# Patient Record
Sex: MALE
[De-identification: names, ages, dates, MRNs, and addresses within clinical notes are randomized per-mention and may not be internally consistent; named-entity substitution may affect disease eponyms.]

---

## 2018-03-20 ENCOUNTER — HOSPITAL ENCOUNTER (EMERGENCY)
Dept: HOSPITAL 31 - C.ER | Age: 58
Discharge: HOME | End: 2018-03-20
Payer: MEDICAID

## 2018-03-20 VITALS — HEART RATE: 60 BPM | SYSTOLIC BLOOD PRESSURE: 148 MMHG | DIASTOLIC BLOOD PRESSURE: 84 MMHG | RESPIRATION RATE: 18 BRPM

## 2018-03-20 VITALS — TEMPERATURE: 98.8 F

## 2018-03-20 VITALS — OXYGEN SATURATION: 98 %

## 2018-03-20 DIAGNOSIS — F17.210: ICD-10-CM

## 2018-03-20 DIAGNOSIS — J36: Primary | ICD-10-CM

## 2018-03-20 LAB
BASOPHILS # BLD AUTO: 0.1 K/UL (ref 0–0.2)
BASOPHILS NFR BLD: 0.6 % (ref 0–2)
BUN SERPL-MCNC: 12 MG/DL (ref 9–20)
CALCIUM SERPL-MCNC: 9.4 MG/DL (ref 8.6–10.4)
EOSINOPHIL # BLD AUTO: 0.1 K/UL (ref 0–0.7)
EOSINOPHIL NFR BLD: 0.9 % (ref 0–4)
ERYTHROCYTE [DISTWIDTH] IN BLOOD BY AUTOMATED COUNT: 14.1 % (ref 11.5–14.5)
GFR NON-AFRICAN AMERICAN: > 60
HGB BLD-MCNC: 14.9 G/DL (ref 12–18)
LYMPHOCYTES # BLD AUTO: 2 K/UL (ref 1–4.3)
LYMPHOCYTES NFR BLD AUTO: 19.5 % (ref 20–40)
MCH RBC QN AUTO: 27 PG (ref 27–31)
MCHC RBC AUTO-ENTMCNC: 33.2 G/DL (ref 33–37)
MCV RBC AUTO: 81.4 FL (ref 80–94)
MONOCYTES # BLD: 0.7 K/UL (ref 0–0.8)
MONOCYTES NFR BLD: 6.9 % (ref 0–10)
NEUTROPHILS # BLD: 7.3 K/UL (ref 1.8–7)
NEUTROPHILS NFR BLD AUTO: 72.1 % (ref 50–75)
NRBC BLD AUTO-RTO: 0.1 % (ref 0–2)
PLATELET # BLD: 218 K/UL (ref 130–400)
PMV BLD AUTO: 9.2 FL (ref 7.2–11.7)
RBC # BLD AUTO: 5.51 MIL/UL (ref 4.4–5.9)
WBC # BLD AUTO: 10.1 K/UL (ref 4.8–10.8)

## 2018-03-20 PROCEDURE — 85025 COMPLETE CBC W/AUTO DIFF WBC: CPT

## 2018-03-20 PROCEDURE — 96375 TX/PRO/DX INJ NEW DRUG ADDON: CPT

## 2018-03-20 PROCEDURE — 96365 THER/PROPH/DIAG IV INF INIT: CPT

## 2018-03-20 PROCEDURE — 70491 CT SOFT TISSUE NECK W/DYE: CPT

## 2018-03-20 PROCEDURE — 99283 EMERGENCY DEPT VISIT LOW MDM: CPT

## 2018-03-20 PROCEDURE — 87070 CULTURE OTHR SPECIMN AEROBIC: CPT

## 2018-03-20 PROCEDURE — 80048 BASIC METABOLIC PNL TOTAL CA: CPT

## 2018-03-20 PROCEDURE — 87430 STREP A AG IA: CPT

## 2018-03-20 NOTE — CT
PROCEDURE:  CT NECK WITH  CONTRAST



HISTORY:

Left submandibular mass



COMPARISON:

None



TECHNIQUE:

CT of the neck with intravenous contrast. Coronal and sagittal 

reformats generated.



Intravenous contrast dose: 100 cc Visipaque 320



Radiation dose: .98 mGy-cm



This CT exam was performed using one or more of the following dose 

reduction techniques: Automated exposure control, adjustment of the 

mA and/or kV according to patient size, and/or use of iterative 

reconstruction technique.



FINDINGS:



NASOPHARYNX:

Within normal limits.



SUPRAHYOID NECK:

There is an apparent 13 x 6 mm low-density lesion in the left lingual 

tonsil. There is no bulky mass in the oropharynx, oral cavity.  The 

parapharyngeal and retropharyngeal spaces are normal. 



INFRAHYOID NECK:

There is no mass or abnormal enhancement in the larynx, hypopharynx, 

and supraglottic space. Vocal cords intact.



MASS:

None.



GLANDS:

The right submandibular gland is normal in size with homogeneous 

enhancement without focal mass. The left submandibular gland is 

normal in size without focal mass or sialoadenitis. Parotid glands 

are normal in size without focal mass. Normal size thyroid gland, 

there is a low-attenuation nodule in the left thyroid lobe.



LYMPH NODES:

At the site of clinically palpable lump, there is a 2.1 x 1.6 cm 

multilocular hyperdense nodule with mild surrounding fat stranding. 

There are asymmetrically enlarged left cervical chain lymph nodes 

including posterior triangle lymph nodes. 



CERVICAL SPINE:

No fracture or focal lesion. Multilevel degenerative disc disease.



VASCULAR STRUCTURES:

Unremarkable.



OTHER FINDINGS:

There is mild paraseptal emphysema in the lung apices.



IMPRESSION:

1. Enlarged cystic 2.1 x 1.6 cm submandibular lymph node at the site 

of clinically palpable lump. Left cervical chain lymphadenopathy. 

Also noted is 13.6 mm low density nodule in the left tonsil which 

could represent a peritonsillar abscess with suppurative 

lymphadenopathy. However neoplastic etiology cannot be entirely 

excluded.  Please correlate with histopathology.

## 2018-03-20 NOTE — C.PDOC
History Of Present Illness





58 yo male w/o significant PMHx come in for evaluation of slightly tender mass 

gradually developed for past 2 days over Left submandibular area. Pt sts," 

though its tooth abscess first but my teeth does not hurt". Pt reports, pain is 

slight, localized over Left submandibular area and " maybe little worse with 

chewing". Otherwise, pt denies fever, chills, recent illness, headache, 

dizziness, drooling, trismus, recent dental work, earache or discharges, vertigo

, neck pain, cough, CP, SOB, abd. pain, N?V, denies nay other active 

complaints. Ambulate to ED for evaluation, not in any apparent distress.


Time Seen by Provider: 03/20/18 15:31


Chief Complaint (Nursing): ENT Problem


History Per: Patient





Past Medical History


Reviewed: Historical Data, Nursing Documentation, Vital Signs


Vital Signs: 


 Last Vital Signs











Temp  98.8 F   03/20/18 14:54


 


Pulse  60   03/20/18 18:30


 


Resp  18   03/20/18 18:30


 


BP  148/84   03/20/18 18:30


 


Pulse Ox  98   03/20/18 18:49














- Medical History


PMH: No Chronic Diseases


Surgical History: No Surg Hx


Family History: States: No Known Family Hx





- Social History


Hx Alcohol Use: No


Hx Substance Use: No





- Immunization History


Hx Tetanus Toxoid Vaccination: No


Hx Pneumococcal Vaccination: No





Review Of Systems


Except As Marked, All Systems Reviewed And Found Negative.


Constitutional: Negative for: Fever, Chills, Malaise, Weight loss


Eyes: Negative for: Vision Change, Redness


ENT: Positive for: Mouth Pain, Mouth Swelling.  Negative for: Ear Pain, Ear 

Discharge, Nose Pain, Nose Discharge, Nose Congestion, Throat Pain, Throat 

Swelling


Cardiovascular: Negative for: Chest Pain


Respiratory: Negative for: Cough, Shortness of Breath, Wheezing


Gastrointestinal: Negative for: Nausea, Vomiting, Abdominal Pain, Diarrhea


Genitourinary: Negative for: Incontinence


Musculoskeletal: Negative for: Neck Pain


Neurological: Negative for: Weakness, Numbness, Altered Mental Status, Headache

, Dizziness





Physical Exam





- Physical Exam


Appears: Well, Non-toxic, No Acute Distress


Skin: Normal Color, Warm, Dry, No Rash


Head: Normacephalic


Eye(s): bilateral: PERRL


Ear(s): Bilateral: Normal


Nose: No Flaring, No Discharge, No Deformity, No Tenderness


Oral Mucosa: Moist, No Drooling, No Trismus


Tongue: Normal Appearing, No Swelling, No Lesions


Lips: Normal Appearing, No Swelling, No Lesions


Teeth: Normal Dentition, No Caries, No Dentures, No Tender To Palpation


Gingiva: No Erythema, No Swelling, No Tender, No Abscess


Throat: No Erythema, No Drooling


Neck: Trachea Midline, Supple, Other ((-) meningeal sign)


Lymphatic: Adenopathy (Left submandibular slightly tender mass 3 cm diameter)


Cardiovascular: Rhythm Regular, No Murmur, No JVD


Respiratory: No Decreased Breath Sounds, No Accessory Muscle Use, No Stridor, 

No Wheezing


Gastrointestinal/Abdominal: Soft, No Tenderness, No Distention, No Guarding


Extremity: Normal ROM, No Deformity, No Swelling


Neurological/Psych: Oriented x3, Normal Speech





ED Course And Treatment





- Laboratory Results


Result Diagrams: 


 03/20/18 15:48





 03/20/18 15:48


Lab Interpretation: No Acute Changes


O2 Sat by Pulse Oximetry: 98


Pulse Ox Interpretation: Normal





- CT Scan/US


  ** CT neck soft tissue


Other Rad Studies (CT/US): Radiology Report Reviewed


CT/US Interpretation: IMPRESSION:  1. Enlarged cystic 2.1 x 1.6 cm 

submandibular lymph node at the site of clinically palpable lump. Left cervical 

chain lymphadenopathy. Also noted is 13.6 mm low density nodule in the left 

tonsil which could represent a peritonsillar abscess with suppurative 

lymphadenopathy. However neoplastic etiology cannot be entirely excluded.  

Please correlate with histopathology.


Progress Note: Pt was OBS in Ed for 3 hours and reamined stable.  On re-

evaluation, pt is afebrile, hemodynamicaly stable.  Non-toxic. Tolerate PO well 

in ED.  Pt denies dysphagia or dyspnea.  PulseOx 98% RA.  Neck: Supple, (-) 

meningeal sign.  ENT: no acute findings. uvul amidline, no edema.  Lungs: CTA B/

L, BS equal B/L.  CVS: (+)S1S2, reg.  Abd: benign, (-) guarding, (-) rebound.  

Neuorlogicaly intact.  Bood work review, no leukocytosis, no left shift.  

Imaging review likely peritonsillar abscess with lymphodenopathy.  results 

review and discused with patient.  SInce. pt is stable, no dyspnea or dysphagia

, no trismus, pt is stable for discharge now.  Pt advised.  ref. to f/u with 

ENT in  1-2 days for re-eval.  return to ED immediately if any worning or new 

changes.  Pt agrees with discharges.





Disposition


Counseled Patient/Family Regarding: Studies Performed, Diagnosis, Need For 

Followup, Rx Given





- Disposition


Referrals: 


Behin,Babak, MD [Staff Provider] - 


Disposition: HOME/ ROUTINE


Disposition Time: 19:30


Condition: STABLE


Additional Instructions: 


Encourage fluids


Take medication as prescribed


Follow up with ENT in 1-2 days for re-evaluation.


return to ED if any worsening or new changes.


Prescriptions: 


Clindamycin [Cleocin] 300 mg PO Q6 #28 cap


Prednisone [Deltasone] 60 mg PO DAILY #9 tablet


Instructions:  Peritonsillar Abscess, Adult (DC)


Forms:  [facility] (English)





- Clinical Impression


Clinical Impression: 


 Peritonsillar abscess

## 2018-03-27 ENCOUNTER — HOSPITAL ENCOUNTER (EMERGENCY)
Dept: HOSPITAL 31 - C.ER | Age: 58
Discharge: ANOTHER HOSPITAL | End: 2018-03-27
Payer: MEDICAID

## 2018-03-27 VITALS
TEMPERATURE: 100.8 F | HEART RATE: 84 BPM | SYSTOLIC BLOOD PRESSURE: 107 MMHG | RESPIRATION RATE: 20 BRPM | OXYGEN SATURATION: 98 % | DIASTOLIC BLOOD PRESSURE: 76 MMHG

## 2018-03-27 DIAGNOSIS — R22.1: Primary | ICD-10-CM

## 2018-03-27 LAB
APTT BLD: 40 SECONDS (ref 21–34)
BASOPHILS # BLD AUTO: 0.1 K/UL (ref 0–0.2)
BASOPHILS NFR BLD: 0.5 % (ref 0–2)
BUN SERPL-MCNC: 15 MG/DL (ref 9–20)
CALCIUM SERPL-MCNC: 9.3 MG/DL (ref 8.6–10.4)
EOSINOPHIL # BLD AUTO: 0 K/UL (ref 0–0.7)
EOSINOPHIL NFR BLD: 0.1 % (ref 0–4)
ERYTHROCYTE [DISTWIDTH] IN BLOOD BY AUTOMATED COUNT: 14.1 % (ref 11.5–14.5)
GFR NON-AFRICAN AMERICAN: > 60
HGB BLD-MCNC: 15 G/DL (ref 12–18)
INR PPP: 1.4
LYMPHOCYTE: 13 % (ref 20–40)
LYMPHOCYTES # BLD AUTO: 1.7 K/UL (ref 1–4.3)
LYMPHOCYTES NFR BLD AUTO: 9.8 % (ref 20–40)
MCH RBC QN AUTO: 26.7 PG (ref 27–31)
MCHC RBC AUTO-ENTMCNC: 33.5 G/DL (ref 33–37)
MCV RBC AUTO: 79.9 FL (ref 80–94)
MONOCYTE: 8 % (ref 0–10)
MONOCYTES # BLD: 1.8 K/UL (ref 0–0.8)
MONOCYTES NFR BLD: 10.5 % (ref 0–10)
NEUTROPHILS # BLD: 13.4 K/UL (ref 1.8–7)
NEUTROPHILS NFR BLD AUTO: 78 % (ref 50–75)
NEUTROPHILS NFR BLD AUTO: 79.1 % (ref 50–75)
NEUTS BAND NFR BLD: 1 % (ref 0–2)
NRBC BLD AUTO-RTO: 0 % (ref 0–2)
PLATELET # BLD EST: NORMAL 10*3/UL
PLATELET # BLD: 271 K/UL (ref 130–400)
PMV BLD AUTO: 8.4 FL (ref 7.2–11.7)
PROTHROMBIN TIME: 15.8 SECONDS (ref 9.7–12.2)
RBC # BLD AUTO: 5.6 MIL/UL (ref 4.4–5.9)
TOTAL CELLS COUNTED BLD: 100
WBC # BLD AUTO: 17 K/UL (ref 4.8–10.8)

## 2018-03-27 PROCEDURE — 85610 PROTHROMBIN TIME: CPT

## 2018-03-27 PROCEDURE — 80048 BASIC METABOLIC PNL TOTAL CA: CPT

## 2018-03-27 PROCEDURE — 85025 COMPLETE CBC W/AUTO DIFF WBC: CPT

## 2018-03-27 PROCEDURE — 87040 BLOOD CULTURE FOR BACTERIA: CPT

## 2018-03-27 PROCEDURE — 85730 THROMBOPLASTIN TIME PARTIAL: CPT

## 2018-03-27 PROCEDURE — 70491 CT SOFT TISSUE NECK W/DYE: CPT

## 2018-03-27 PROCEDURE — 99284 EMERGENCY DEPT VISIT MOD MDM: CPT

## 2018-03-27 NOTE — CT
PROCEDURE:  CT NECK WITH  CONTRAST



HISTORY:

Left neck mass



COMPARISON:

03/20/2018.  CT neck



TECHNIQUE:

CT of the neck with intravenous contrast. Coronal and sagittal 

reformats generated.



Intravenous contrast dose: 100 cc Visipaque 320



Radiation dose: .56 mGy-cm



This CT exam was performed using one or more of the following dose 

reduction techniques: Automated exposure control, adjustment of the 

mA and/or kV according to patient size, and/or use of iterative 

reconstruction technique.



FINDINGS:



NASOPHARYNX:

Unremarkable.



SUPRAHYOID NECK:

Unremarkable oropharynx, oral cavity, parapharyngeal space and 

retropharyngeal space.



INFRAHYOID NECK:

Unremarkable larynx, hypopharynx, and supraglottic space. Vocal cords 

intact.



MASS:

Enlarging left submandibular mass currently measuring 3.5 x 4.4 by 

4.2 cm. On the prior study the mass measured 1.7 x 2.1 x 1.8 cm. 



At its widest diameter of the mass Is interposed between the left 

submandibular gland and left mandible. There is a clear tissue plane 

between the left submandibular gland posteriorly in the mass.



Cutaneous and subcutaneous edema identified. At a more caudal level 

the mass displays contiguity with the sternocleidomastoid gland.  The 

contents of the more posterior carotid sheath are unaffected.



GLANDS:

Parotid and submandibular glands unremarkable. Normal size thyroid 

gland, without nodule.



LYMPH NODES:

Small lymph nodes retromolar trigone region interposed between the 

mass and sternocleidomastoid. This likely represents stable reactive 

lymphadenopathy.



CERVICAL SPINE:

No fracture or focal lesion. 



VASCULAR STRUCTURES:

Unremarkable.



OTHER FINDINGS:

None.



IMPRESSION:

Marked increase in size of cystic submandibular mass.  Peripheral 

contrast-enhancing rim and central areas of necrosis of multiple 

septa identified. 



Previously identified tonsillar mass now measures less than 7 mm.

## 2018-03-27 NOTE — C.PDOC
History Of Present Illness





58 yo male referred to ED by DR.Behin for re-evaluation of Left lateral neck 

mass pt gradually developed for past 2 weeks. Pt reports, was seen here in ED 2 

weeks ago when CT of neck soft tissue performed with non conclusive results 

mass vs acute peritonsilar abscess. Pt sts, complete course of abx without 

significant improvement. Pt admits, for past week developed weakness, fatigue, 

low grade fever. Otherwise, pt denies high fever, headache, dizziness, known 

trauma or injury, drooling, trismus, earaches, vertigo, cough, CP, SOB, dyspnea

, palpitation, abd. pain, N/V, denies nay other active complaints. Ambulate to 

ED, not in resp. distress.


Time Seen by Provider: 03/27/18 15:01


Chief Complaint (Nursing): ENT Problem


History Per: Patient





Past Medical History


Reviewed: Historical Data, Nursing Documentation, Vital Signs


Vital Signs: 


 Last Vital Signs











Temp  102.8 F H  03/27/18 17:54


 


Pulse  99 H  03/27/18 17:54


 


Resp  16   03/27/18 17:54


 


BP  122/76   03/27/18 17:54


 


Pulse Ox  95   03/27/18 17:54














- Medical History


PMH: No Chronic Diseases


Surgical History: No Surg Hx


Family History: States: No Known Family Hx





- Social History


Hx Tobacco Use: No


Hx Alcohol Use: No


Hx Substance Use: No





- Immunization History


Hx Tetanus Toxoid Vaccination: No


Hx Pneumococcal Vaccination: No





Review Of Systems


Except As Marked, All Systems Reviewed And Found Negative.


Constitutional: Positive for: Fever (low grade), Malaise.  Negative for: Chills


Eyes: Negative for: Vision Change


ENT: Positive for: Mouth Pain, Throat Pain.  Negative for: Ear Pain, Ear 

Discharge, Nose Discharge


Cardiovascular: Negative for: Chest Pain, Palpitations, Edema, Light Headedness


Respiratory: Negative for: Cough, Shortness of Breath, Wheezing


Gastrointestinal: Negative for: Nausea, Vomiting, Abdominal Pain


Genitourinary: Negative for: Incontinence


Musculoskeletal: Positive for: Neck Pain.  Negative for: Back Pain


Skin: Positive for: Other (Left sided neck mass)


Neurological: Negative for: Weakness, Numbness, Altered Mental Status, Headache

, Dizziness





Physical Exam





- Physical Exam


Appears: Well, Non-toxic, No Acute Distress


Skin: Normal Color, Warm, Dry, No Rash


Head: Normacephalic


Eye(s): bilateral: PERRL


Ear(s): Bilateral: Normal


Nose: No Flaring, No Discharge, No Deformity, No Tenderness


Oral Mucosa: Moist, No Drooling, No Trismus


Tongue: Normal Appearing


Lips: Normal Appearing


Throat: No Erythema, No Drooling, Other (Uvula midline, no edema.)


Neck: Trachea Midline, No Midline Cervical Tenderness, No Paracervical 

Tenderness, No Step Off Deformity, Supple, Other (Left submandibular tender 

solid mass 5#6 cm, diffuse erythema extends down to anterior chest. No 

flactulance.)


Lymphatic: Adenopathy (Left submandibular mass)


Cardiovascular: Rhythm Regular, No Murmur, No JVD


Respiratory: No Decreased Breath Sounds, No Accessory Muscle Use, No Stridor, 

No Wheezing


Gastrointestinal/Abdominal: Soft, No Tenderness, No Distention, No Guarding


Extremity: Normal ROM, No Pedal Edema, No Deformity


Neurological/Psych: Oriented x3, Normal Speech, Normal Motor, Normal Sensation, 

Normal Reflexes





ED Course And Treatment





- Laboratory Results


Result Diagrams: 


 03/27/18 15:28





 03/27/18 15:28


Lab Interpretation: Abnormal


O2 Sat by Pulse Oximetry: 96


Pulse Ox Interpretation: Normal





- CT Scan/US


  ** CT neck soft tissue w/IV contrast


Other Rad Studies (CT/US): Radiology Report Reviewed


CT/US Interpretation: FINDINGS:  NASOPHARYNX:  Unremarkable.  SUPRAHYOID NECK:  

Unremarkable oropharynx, oral cavity, parapharyngeal space and retropharyngeal 

space.  INFRAHYOID NECK:  Unremarkable larynx, hypopharynx, and supraglottic 

space. Vocal cords intact.  MASS:  Enlarging left submandibular mass currently 

measuring 3.5 x 4.4 by 4.2 cm. On the prior study the mass measured 1.7 x 2.1 x 

1.8 cm.  At its widest diameter of the mass Is interposed between the left 

submandibular gland and left mandible. There is a clear tissue plane between 

the left submandibular gland posteriorly in the mass.  Cutaneous and 

subcutaneous edema identified. At a more caudal level the mass displays 

contiguity with the sternocleidomastoid gland.  The contents of the more 

posterior carotid sheath are unaffected.  GLANDS:  Parotid and submandibular 

glands unremarkable. Normal size thyroid gland, without nodule.  LYMPH NODES:  

Small lymph nodes retromolar trigone region interposed between the mass and 

sternocleidomastoid. This likely represents stable reactive lymphadenopathy.  

CERVICAL SPINE:  No fracture or focal lesion.  VASCULAR STRUCTURES:  

Unremarkable.  OTHER FINDINGS:  None.  IMPRESSION:  Marked increase in size of 

cystic submandibular mass.  Peripheral contrast-enhancing rim and central areas 

of necrosis of multiple septa identified.  Previously identified tonsillar mass 

now measures less than 7 mm.


Progress Note: Case discussed with ENT Dr.Behin who cleared patient from his 

service, recommend transfer with available max/face and dental service.  

 was paged at 17:19, no answer.  At 18:20, case dsicussed with Max/

Face resident from [facility] who recommend transfer to ED for evaluation.  Case 

discussed with [facility] ED attending and transfer accepted.  Results review and 

dsicussed with pateint.  Pt agree swith plan of transfer.  At present time, pt 

appears comforatble, not in resp. distress. Sonu drooling or stridor.  PulseOx 95

% RA.  Lungs: CTA B/L, BS equal B/L.  Pt is stable for transfer now.





Disposition





- Disposition


Disposition: Trans to Other Acute Care Hosp


Disposition Time: 18:23


Condition: STABLE


Forms:  [facility] (English)





- Clinical Impression


Clinical Impression: 


 Mass of submandibular region